# Patient Record
Sex: FEMALE | Race: OTHER | HISPANIC OR LATINO | URBAN - METROPOLITAN AREA
[De-identification: names, ages, dates, MRNs, and addresses within clinical notes are randomized per-mention and may not be internally consistent; named-entity substitution may affect disease eponyms.]

---

## 2019-02-12 ENCOUNTER — OFFICE (OUTPATIENT)
Dept: URBAN - METROPOLITAN AREA CLINIC 78 | Facility: CLINIC | Age: 29
End: 2019-02-12
Payer: COMMERCIAL

## 2019-02-12 VITALS
SYSTOLIC BLOOD PRESSURE: 107 MMHG | HEIGHT: 65 IN | DIASTOLIC BLOOD PRESSURE: 73 MMHG | WEIGHT: 162 LBS | TEMPERATURE: 97.6 F | HEART RATE: 93 BPM

## 2019-02-12 DIAGNOSIS — R10.10 UPPER ABDOMINAL PAIN, UNSPECIFIED: ICD-10-CM

## 2019-02-12 DIAGNOSIS — R11.0 NAUSEA: ICD-10-CM

## 2019-02-12 DIAGNOSIS — K59.09 OTHER CONSTIPATION: ICD-10-CM

## 2019-02-12 DIAGNOSIS — K80.20 CALCULUS OF GALLBLADDER WITHOUT CHOLECYSTITIS WITHOUT OBSTRU: ICD-10-CM

## 2019-02-12 PROCEDURE — 99244 OFF/OP CNSLTJ NEW/EST MOD 40: CPT

## 2019-02-12 NOTE — SERVICEHPINOTES
ANCA DELEON   is a   28   year old    female who is being seen in consultation at the request of   GERALD MOY   for gallstones. She reports intermittent issues with constipation and upper abdominal pain. She reports occasional constipation states she will have BMs once daily for a few weeks and then some weeks can go a few days w/o BM. Will take laxatives prn. Will have gas/bloating after eating, jerilyn after eating dairy products so she tries to avoid these. No blood in the stool. No family hx of colon cancer, polyps, or IBD. Occasional nausea in the mornings if she eats late at night--does not vomit. No dysphagia. Drinks a lot of water. She denies any postprandial abdominal pain. No regular NSAIDs. She had an u/s on 2/5 which showed a single mobile gallstone without evidence of biliary dilation, wall thickening, or acute cholecystitis.

## 2019-02-13 LAB
CELIAC DISEASE COMPREHENSIVE PANEL: IMMUNOGLOBULIN A: 356 MG/DL (ref 81–463)
CELIAC DISEASE COMPREHENSIVE PANEL: INTERPRETATION: (no result)
CELIAC DISEASE COMPREHENSIVE PANEL: TISSUE TRANSGLUTAMINASE AB, IGA: 1 U/ML